# Patient Record
Sex: MALE | Race: WHITE | NOT HISPANIC OR LATINO | Employment: OTHER | URBAN - METROPOLITAN AREA
[De-identification: names, ages, dates, MRNs, and addresses within clinical notes are randomized per-mention and may not be internally consistent; named-entity substitution may affect disease eponyms.]

---

## 2019-02-02 ENCOUNTER — OUTPATIENT INFUSION SERVICES (OUTPATIENT)
Dept: ONCOLOGY | Facility: MEDICAL CENTER | Age: 69
End: 2019-02-02
Attending: INTERNAL MEDICINE
Payer: MEDICARE

## 2019-02-02 VITALS
HEART RATE: 86 BPM | DIASTOLIC BLOOD PRESSURE: 88 MMHG | SYSTOLIC BLOOD PRESSURE: 138 MMHG | HEIGHT: 70 IN | WEIGHT: 175.04 LBS | TEMPERATURE: 97.6 F | BODY MASS INDEX: 25.06 KG/M2 | RESPIRATION RATE: 18 BRPM | OXYGEN SATURATION: 98 %

## 2019-02-02 DIAGNOSIS — E83.110 HEREDITARY HEMOCHROMATOSIS (HCC): ICD-10-CM

## 2019-02-02 LAB
FERRITIN SERPL-MCNC: 50.5 NG/ML (ref 22–322)
HCT VFR BLD CALC: 42 % (ref 42–52)
HGB BLD-MCNC: 14.3 G/DL (ref 14–18)

## 2019-02-02 PROCEDURE — 36415 COLL VENOUS BLD VENIPUNCTURE: CPT

## 2019-02-02 PROCEDURE — 85014 HEMATOCRIT: CPT

## 2019-02-02 PROCEDURE — 82728 ASSAY OF FERRITIN: CPT

## 2019-02-02 PROCEDURE — 99195 PHLEBOTOMY: CPT

## 2019-02-02 RX ORDER — SUCRALFATE 1 G/1
1 TABLET ORAL
COMMUNITY
End: 2022-12-07

## 2019-02-03 NOTE — PROGRESS NOTES
"Patient here for TP today. Denies any complaints. Per patient request, two 20 ga IVs placed- one in LAC and one in RAC; brisk blood return noted. Ferritin sent per orders. IStat H/H ran per protocol. WNL for treatment today. Per patient request in the LAC 500ml Bolus infusing while in the RAC 500cc blood removed.(states he \"passes out\" otherwise and \"needs the code cart\") Tolerated well. Orthostatic vitals WNL. PIVs flushed and removed with tips intact, covered with coban and gauze. Will call tomorrow for next months appointment. Discharged home to self care with no dizziness, nausea, or distress.   "

## 2019-03-02 ENCOUNTER — OUTPATIENT INFUSION SERVICES (OUTPATIENT)
Dept: ONCOLOGY | Facility: MEDICAL CENTER | Age: 69
End: 2019-03-02
Attending: INTERNAL MEDICINE
Payer: MEDICARE

## 2019-03-02 VITALS
WEIGHT: 174.16 LBS | OXYGEN SATURATION: 94 % | BODY MASS INDEX: 24.93 KG/M2 | HEART RATE: 87 BPM | RESPIRATION RATE: 18 BRPM | HEIGHT: 70 IN | SYSTOLIC BLOOD PRESSURE: 146 MMHG | DIASTOLIC BLOOD PRESSURE: 87 MMHG | TEMPERATURE: 97.9 F

## 2019-03-02 DIAGNOSIS — E83.110 HEREDITARY HEMOCHROMATOSIS (HCC): ICD-10-CM

## 2019-03-02 LAB
FERRITIN SERPL-MCNC: 27.4 NG/ML (ref 22–322)
HCT VFR BLD CALC: 44 % (ref 42–52)
HGB BLD-MCNC: 15 G/DL (ref 14–18)

## 2019-03-02 PROCEDURE — 96360 HYDRATION IV INFUSION INIT: CPT

## 2019-03-02 PROCEDURE — 82728 ASSAY OF FERRITIN: CPT

## 2019-03-02 PROCEDURE — 99195 PHLEBOTOMY: CPT

## 2019-03-02 PROCEDURE — 700105 HCHG RX REV CODE 258: Performed by: INTERNAL MEDICINE

## 2019-03-02 PROCEDURE — 85014 HEMATOCRIT: CPT

## 2019-03-02 PROCEDURE — 36415 COLL VENOUS BLD VENIPUNCTURE: CPT

## 2019-03-02 RX ORDER — SODIUM CHLORIDE 9 MG/ML
500 INJECTION, SOLUTION INTRAVENOUS ONCE
Status: COMPLETED | OUTPATIENT
Start: 2019-03-02 | End: 2019-03-02

## 2019-03-02 RX ADMIN — SODIUM CHLORIDE 500 ML: 9 INJECTION, SOLUTION INTRAVENOUS at 13:30

## 2019-03-02 NOTE — PROGRESS NOTES
Pt is here for his scheduled TP. Receives NS 500ml bolus before/during TP procedure to prevent vagal response with hypotension. Two PIVs established at L AC. Hb 15 today - TP indicated. TP/hydration completed without an incident. Orthostatic vitals stable (see EPIC). Discharged home to self care.

## 2019-04-06 ENCOUNTER — OUTPATIENT INFUSION SERVICES (OUTPATIENT)
Dept: ONCOLOGY | Facility: MEDICAL CENTER | Age: 69
End: 2019-04-06
Attending: INTERNAL MEDICINE
Payer: MEDICARE

## 2019-04-06 VITALS
SYSTOLIC BLOOD PRESSURE: 128 MMHG | HEART RATE: 72 BPM | OXYGEN SATURATION: 94 % | RESPIRATION RATE: 16 BRPM | DIASTOLIC BLOOD PRESSURE: 95 MMHG | HEIGHT: 70 IN | BODY MASS INDEX: 25.41 KG/M2 | WEIGHT: 177.47 LBS | TEMPERATURE: 98.8 F

## 2019-04-06 DIAGNOSIS — E83.110 HEREDITARY HEMOCHROMATOSIS (HCC): ICD-10-CM

## 2019-04-06 LAB
FERRITIN SERPL-MCNC: 16.3 NG/ML (ref 22–322)
HCT VFR BLD CALC: 47 % (ref 42–52)
HGB BLD-MCNC: 16 G/DL (ref 14–18)

## 2019-04-06 PROCEDURE — 96360 HYDRATION IV INFUSION INIT: CPT

## 2019-04-06 PROCEDURE — 700105 HCHG RX REV CODE 258: Performed by: INTERNAL MEDICINE

## 2019-04-06 PROCEDURE — 85014 HEMATOCRIT: CPT

## 2019-04-06 PROCEDURE — 36415 COLL VENOUS BLD VENIPUNCTURE: CPT

## 2019-04-06 PROCEDURE — 82728 ASSAY OF FERRITIN: CPT

## 2019-04-06 PROCEDURE — 99195 PHLEBOTOMY: CPT

## 2019-04-06 RX ORDER — SODIUM CHLORIDE 9 MG/ML
500 INJECTION, SOLUTION INTRAVENOUS ONCE
Status: COMPLETED | OUTPATIENT
Start: 2019-04-06 | End: 2019-04-06

## 2019-04-06 RX ADMIN — SODIUM CHLORIDE 500 ML: 9 INJECTION, SOLUTION INTRAVENOUS at 17:00

## 2019-04-06 NOTE — PROGRESS NOTES
Patient arrived ambulatory to the [facility] for monthly TP. Reviewed vital signs, labs, and physician order. Patient denies S&S of infection, or bleeding. IV access established in left AC,  visualized brisk blood return, labs collected per MD order. Hydration administered prior to and during TP. Hgb 16.0 within parameters to receive TP. 2nd IV access established in right AC, TP conducted while hydration administered, no adverse reaction observed. Orthostatic vital signs assessed, see results in Epic. IV's flushed per protocol, catheters removed with tip intacts, gauze and coban dressing placed. Confirmed upcoming appointment date and time with patient. Contact made with Dr Vivar office re: Ferritin level of  16.3, and need for new MD orders and frequency as pt with have new MD. Future apt's in place, awaiting new orders. Patient left the [facility] ambulatory in no sign of distress.

## 2019-05-04 ENCOUNTER — APPOINTMENT (OUTPATIENT)
Dept: ONCOLOGY | Facility: MEDICAL CENTER | Age: 69
End: 2019-05-04
Attending: INTERNAL MEDICINE
Payer: MEDICARE

## 2019-06-01 ENCOUNTER — APPOINTMENT (OUTPATIENT)
Dept: ONCOLOGY | Facility: MEDICAL CENTER | Age: 69
End: 2019-06-01
Attending: INTERNAL MEDICINE
Payer: MEDICARE

## 2019-07-06 ENCOUNTER — OUTPATIENT INFUSION SERVICES (OUTPATIENT)
Dept: ONCOLOGY | Facility: MEDICAL CENTER | Age: 69
End: 2019-07-06
Attending: INTERNAL MEDICINE
Payer: MEDICARE

## 2019-07-06 NOTE — PROGRESS NOTES
Attempt made to contact pt prior to scheduled apt time to cancel TP due to Ferritin level on 4/6/19 of 16.3 per telephone order read back from Dr Sanchez. Pt to follow up with MD prior to scheduling next TP.Mesage left with number on record by charge WES Lopez.

## 2021-01-15 DIAGNOSIS — Z23 NEED FOR VACCINATION: ICD-10-CM

## 2021-02-10 ENCOUNTER — IMMUNIZATION (OUTPATIENT)
Dept: FAMILY PLANNING/WOMEN'S HEALTH CLINIC | Facility: IMMUNIZATION CENTER | Age: 71
End: 2021-02-10
Attending: INTERNAL MEDICINE
Payer: MEDICARE

## 2021-02-10 DIAGNOSIS — Z23 NEED FOR VACCINATION: ICD-10-CM

## 2021-02-10 DIAGNOSIS — Z23 ENCOUNTER FOR VACCINATION: Primary | ICD-10-CM

## 2021-02-10 PROCEDURE — 0001A PFIZER SARS-COV-2 VACCINE: CPT

## 2021-02-10 PROCEDURE — 91300 PFIZER SARS-COV-2 VACCINE: CPT

## 2021-03-03 ENCOUNTER — IMMUNIZATION (OUTPATIENT)
Dept: FAMILY PLANNING/WOMEN'S HEALTH CLINIC | Facility: IMMUNIZATION CENTER | Age: 71
End: 2021-03-03
Attending: INTERNAL MEDICINE
Payer: MEDICARE

## 2021-03-03 DIAGNOSIS — Z23 ENCOUNTER FOR VACCINATION: Primary | ICD-10-CM

## 2021-03-03 PROCEDURE — 0002A PFIZER SARS-COV-2 VACCINE: CPT | Performed by: INTERNAL MEDICINE

## 2021-03-03 PROCEDURE — 91300 PFIZER SARS-COV-2 VACCINE: CPT | Performed by: INTERNAL MEDICINE

## 2022-10-07 ENCOUNTER — OUTPATIENT INFUSION SERVICES (OUTPATIENT)
Dept: ONCOLOGY | Facility: MEDICAL CENTER | Age: 72
End: 2022-10-07
Attending: INTERNAL MEDICINE
Payer: MEDICARE

## 2022-10-07 VITALS
HEART RATE: 81 BPM | BODY MASS INDEX: 23.4 KG/M2 | WEIGHT: 176.59 LBS | OXYGEN SATURATION: 95 % | DIASTOLIC BLOOD PRESSURE: 64 MMHG | HEIGHT: 73 IN | SYSTOLIC BLOOD PRESSURE: 116 MMHG | TEMPERATURE: 98 F | RESPIRATION RATE: 16 BRPM

## 2022-10-07 DIAGNOSIS — E83.110 HEREDITARY HEMOCHROMATOSIS (HCC): ICD-10-CM

## 2022-10-07 LAB
FERRITIN SERPL-MCNC: 382 NG/ML (ref 22–322)
HCT VFR BLD AUTO: 44 % (ref 42–52)
HGB BLD-MCNC: 15.2 G/DL (ref 14–18)

## 2022-10-07 PROCEDURE — 82728 ASSAY OF FERRITIN: CPT

## 2022-10-07 PROCEDURE — 85014 HEMATOCRIT: CPT

## 2022-10-07 PROCEDURE — 99195 PHLEBOTOMY: CPT

## 2022-10-07 PROCEDURE — 700105 HCHG RX REV CODE 258: Performed by: INTERNAL MEDICINE

## 2022-10-07 PROCEDURE — 85018 HEMOGLOBIN: CPT

## 2022-10-07 PROCEDURE — 96360 HYDRATION IV INFUSION INIT: CPT

## 2022-10-07 RX ORDER — SODIUM CHLORIDE 9 MG/ML
500 INJECTION, SOLUTION INTRAVENOUS ONCE
Status: COMPLETED | OUTPATIENT
Start: 2022-10-07 | End: 2022-10-07

## 2022-10-07 RX ADMIN — SODIUM CHLORIDE 500 ML: 9 INJECTION, SOLUTION INTRAVENOUS at 15:59

## 2022-10-08 NOTE — PROGRESS NOTES
Patient presented to [facility] for labs and possible TP.  Peripheral IV placed left AC and right forearm. Both flushed easily, octavia briskly. H&H & Ferritin drawn and sent to lab. Hydration with 500ml NS started.  Hgb 15.2, Hct 44 .  Patient met parameters for TP. 500ml of whole blood phlebotomized. Hydration completed at the same time phlebotomy was completed. [facility] tolerated well, [facility] completed. Orthostatic VSS. Patient denies lightheadedness, dizziness or chest pain. Peripheral IV's removed, catheter tip remained intact. Gauze and coban to sites.  Patient left [facility] in no apparent distress. Next appointment time confirmed prior to departure.

## 2022-11-02 ENCOUNTER — PATIENT MESSAGE (OUTPATIENT)
Dept: HEALTH INFORMATION MANAGEMENT | Facility: OTHER | Age: 72
End: 2022-11-02

## 2022-12-07 ENCOUNTER — OUTPATIENT INFUSION SERVICES (OUTPATIENT)
Dept: ONCOLOGY | Facility: MEDICAL CENTER | Age: 72
End: 2022-12-07
Attending: INTERNAL MEDICINE
Payer: MEDICARE

## 2022-12-07 VITALS
WEIGHT: 179.9 LBS | OXYGEN SATURATION: 94 % | HEIGHT: 73 IN | BODY MASS INDEX: 23.84 KG/M2 | HEART RATE: 78 BPM | DIASTOLIC BLOOD PRESSURE: 72 MMHG | SYSTOLIC BLOOD PRESSURE: 133 MMHG | TEMPERATURE: 97.5 F | RESPIRATION RATE: 18 BRPM

## 2022-12-07 DIAGNOSIS — E83.110 HEREDITARY HEMOCHROMATOSIS (HCC): ICD-10-CM

## 2022-12-07 LAB
FERRITIN SERPL-MCNC: 182 NG/ML (ref 22–322)
HCT VFR BLD AUTO: 45.6 % (ref 42–52)
HGB BLD-MCNC: 15.8 G/DL (ref 14–18)

## 2022-12-07 PROCEDURE — 82728 ASSAY OF FERRITIN: CPT

## 2022-12-07 PROCEDURE — 85018 HEMOGLOBIN: CPT

## 2022-12-07 PROCEDURE — 85014 HEMATOCRIT: CPT

## 2022-12-07 PROCEDURE — 99195 PHLEBOTOMY: CPT

## 2022-12-07 RX ORDER — FLECAINIDE ACETATE 50 MG/1
50 TABLET ORAL EVERY 12 HOURS
COMMUNITY
Start: 2022-10-13

## 2022-12-07 RX ORDER — RIVAROXABAN 20 MG/1
20 TABLET, FILM COATED ORAL EVERY EVENING
COMMUNITY
Start: 2022-10-13

## 2022-12-07 RX ORDER — FAMOTIDINE 40 MG/1
TABLET, FILM COATED ORAL DAILY
COMMUNITY
Start: 2022-08-26

## 2022-12-07 RX ORDER — LISINOPRIL 40 MG/1
40 TABLET ORAL DAILY
COMMUNITY

## 2022-12-07 RX ORDER — METOPROLOL SUCCINATE 25 MG/1
25 TABLET, EXTENDED RELEASE ORAL DAILY
COMMUNITY
Start: 2022-10-13

## 2022-12-07 NOTE — PROGRESS NOTES
Pt arrives for therapeutic phlebotomy for hemochromatosis.  Discussed plan of care with pt.  Pt requests two PIV sites (one PIV for TP on LUE and one PIV for pre-hydration/concurrent hydration during TP).  PIV established to L-AC and H&H/ferritin was drawn.  Pt given a snack/juice prior to TP.  Hemoglobin is 15.8 and hematocrit is 45.6% today.  Pt meets parameters for TP.  PIV placed to R-AC and hydration 250ml given.  500ml of whole blood removed from L-AC site while hydration infused on the R-AC.  Waited 15 minutes post TP.  Orthostatic VS are WNL.  Pt denies dizziness or feeling light headed.  PIV sites x2 removed and sites wrapped with pressure dressing.  This is last order for TP.  Pt will call Dr. Sanchez's office to set up follow up appt.  Pt dc home in stable condition.